# Patient Record
Sex: MALE | Race: WHITE | NOT HISPANIC OR LATINO
[De-identification: names, ages, dates, MRNs, and addresses within clinical notes are randomized per-mention and may not be internally consistent; named-entity substitution may affect disease eponyms.]

---

## 2024-01-17 ENCOUNTER — APPOINTMENT (OUTPATIENT)
Dept: PULMONOLOGY | Facility: CLINIC | Age: 69
End: 2024-01-17

## 2024-02-20 ENCOUNTER — APPOINTMENT (OUTPATIENT)
Dept: PULMONOLOGY | Facility: CLINIC | Age: 69
End: 2024-02-20
Payer: MEDICARE

## 2024-02-20 VITALS
RESPIRATION RATE: 16 BRPM | OXYGEN SATURATION: 97 % | SYSTOLIC BLOOD PRESSURE: 118 MMHG | DIASTOLIC BLOOD PRESSURE: 82 MMHG | HEIGHT: 62 IN | BODY MASS INDEX: 37.54 KG/M2 | HEART RATE: 112 BPM | WEIGHT: 204 LBS

## 2024-02-20 DIAGNOSIS — J45.909 UNSPECIFIED ASTHMA, UNCOMPLICATED: ICD-10-CM

## 2024-02-20 DIAGNOSIS — R06.83 SNORING: ICD-10-CM

## 2024-02-20 DIAGNOSIS — Z82.49 FAMILY HISTORY OF ISCHEMIC HEART DISEASE AND OTHER DISEASES OF THE CIRCULATORY SYSTEM: ICD-10-CM

## 2024-02-20 DIAGNOSIS — Z82.69 FAMILY HISTORY OF OTHER DISEASES OF THE MUSCULOSKELETAL SYSTEM AND CONNECTIVE TISSUE: ICD-10-CM

## 2024-02-20 PROCEDURE — 99205 OFFICE O/P NEW HI 60 MIN: CPT

## 2024-02-20 PROCEDURE — G2211 COMPLEX E/M VISIT ADD ON: CPT

## 2024-02-20 NOTE — REVIEW OF SYSTEMS
[Obesity] : obesity [Fever] : no fever [Fatigue] : no fatigue [Recent Wt Gain (___ Lbs)] : ~T no recent weight gain [Chills] : no chills [Recent Wt Loss (___ Lbs)] : ~T no recent weight loss [Epistaxis] : no epistaxis [Sore Throat] : no sore throat [Nasal Congestion] : no nasal congestion [Postnasal Drip] : no postnasal drip [Dry Mouth] : no dry mouth [Cough] : no cough [Hemoptysis] : no hemoptysis [Sputum] : no sputum [Dyspnea] : no dyspnea [Wheezing] : no wheezing [SOB on Exertion] : no sob on exertion [Chest Discomfort] : no chest discomfort [Palpitations] : no palpitations [GERD] : no gerd [Abdominal Pain] : no abdominal pain [Nausea] : no nausea [Vomiting] : no vomiting [Dysphagia] : no dysphagia [Bleeding] : no bleeding [Back Pain] : no back pain [Chronic Pain] : no chronic pain [Rash] : no rash [Blood Transfusion] : no blood transfusion [Clotting Disorder/ Frequent bleeding] : no clotting disorder/ frequent bleeding [Diabetes] : no diabetes [Thyroid Problem] : no thyroid problem

## 2024-02-20 NOTE — ASSESSMENT
[FreeTextEntry1] : 67y/o  male       1- asthmatic  bronchitis  history   + ex smoker  < 20 pack years       + fumes  2- snoring  napping  daytime sleepiness  BMI   37     ? sleep disorder  3- vaccinations per primary   Recommendations 1- cxr   report requested 2- PFT 3- home sleep study and sleep hygiene reviewed  4- weight loss exercise    sleep on his side   above  reviewed  agreement  on plan and reviewed sleep issues   in detail  including  complications of untreated sleep apnea

## 2024-02-20 NOTE — HISTORY OF PRESENT ILLNESS
[Snoring] : snoring [Former] : former [< 20 pack-years] : < 20 pack-years [Never] : never [TextBox_4] : 2/202/2024   spouse     Milagrool   here for  PFT 67y/o  male born in [place]   ex smoker (   15 y/o - up to  3/4 ppd  x   quit   37 y/o  >   15 pack years ) retired   autobody     -     wheezing -  had asthmatic bronchitis    with wheezing    treated  -    for cough persistent now improved  prednisone  helped   now   finished  - here for PFT /w  and    sleep  noted  snoring    -per wife does not sleep  well due to  her husbands loud snoring and   noises at night -   [TextBox_11] : 3/4 [TextBox_13] : 20 [YearQuit] : 1980's [Awakes with Dry Mouth] : does not awaken with dry mouth [Awakes with Headache] : does not awaken with headache [Fatigue] : no fatigue [TextBox_77] : 9-10  [TextBox_79] : 7  [TextBox_83] : 1

## 2024-04-04 ENCOUNTER — APPOINTMENT (OUTPATIENT)
Dept: PULMONOLOGY | Facility: CLINIC | Age: 69
End: 2024-04-04
Payer: MEDICARE

## 2024-04-04 VITALS
DIASTOLIC BLOOD PRESSURE: 78 MMHG | BODY MASS INDEX: 37.54 KG/M2 | WEIGHT: 204 LBS | OXYGEN SATURATION: 98 % | HEART RATE: 93 BPM | RESPIRATION RATE: 16 BRPM | SYSTOLIC BLOOD PRESSURE: 126 MMHG | HEIGHT: 62 IN

## 2024-04-04 PROCEDURE — G2211 COMPLEX E/M VISIT ADD ON: CPT

## 2024-04-04 PROCEDURE — 99214 OFFICE O/P EST MOD 30 MIN: CPT

## 2024-04-04 NOTE — ASSESSMENT
[FreeTextEntry1] : 70y/o  male       1- asthmatic  bronchitis  history   + ex smoker  < 20 pack years       + fumes  2-  BMI   37     3/24 severe PRESTON  3- vaccinations per primary   Recommendations 1- agrees to  autopap   ordered sleep hygiene reviewed    2- PFT 3-albuterol MDI  two inh  q  6 hour prn  4- cxr report requested again  if cannot  find repeat 5- ENT  upper airway    inspection   reviewed sleep   issues  risks of untreated sleep apnea and benefits of treatment prevention weight loss and  agreement o plan

## 2024-04-04 NOTE — HISTORY OF PRESENT ILLNESS
[Former] : former [< 20 pack-years] : < 20 pack-years [Never] : never [Snoring] : snoring [Home] : home [TextBox_4] : 2/202/2024   spouse     Milagrool   here for  PFT 67y/o  male [place] in [place]   ex smoker (   15 y/o - up to  3/4 ppd  x   quit   39 y/o  >   15 pack years ) retired   autobody     -     wheezing -  had asthmatic bronchitis    with wheezing    treated  -    for cough persistent now improved  prednisone  helped   now   finished  - here for PFT /w  and    sleep  noted  snoring    -per wife does not sleep  well due to  her husbands loud snoring and   noises at night -  4/4/2024 68y/o   male  [place] in [place]  ex  smoker ( < 20 pack years  )   h/o  asthmatic bronchitis     f/u  + sleep study  KI - severe ki  reviewed  study  and  risk of untreated   ki options for treatment discussed cpap  with pictures  -reviewed  MDI use currently < 2 x week -sleep hygiene - no further  wheezing -  [TextBox_11] : 3/4 [TextBox_13] : 20 [YearQuit] : 1980's [Awakes with Dry Mouth] : does not awaken with dry mouth [Awakes with Headache] : does not awaken with headache [Fatigue] : no fatigue [TextBox_77] : 9-10  [TextBox_79] : 7  [TextBox_83] : 1 [TextBox_100] : 3/2024 [TextBox_108] : 58.1 [TextBox_116] : 64 [TextBox_120] : 49 minutes < 90 %

## 2024-04-04 NOTE — PHYSICAL EXAM
[No Acute Distress] : no acute distress [No Deformities] : no deformities [IV] : Mallampati Class: IV [Normal Appearance] : normal appearance [Supple] : supple [No Neck Mass] : no neck mass [No JVD] : no jvd [Normal Rate/Rhythm] : normal rate/rhythm [Normal S1, S2] : normal s1, s2 [No Murmurs] : no murmurs [No Resp Distress] : no resp distress [No Acc Muscle Use] : no acc muscle use [Clear to Auscultation Bilaterally] : clear to auscultation bilaterally [Benign] : benign [Not Tender] : not tender [No Masses] : no masses [Soft] : soft [No Hernias] : no hernias [Normal Bowel Sounds] : normal bowel sounds [Normal Gait] : normal gait [No Clubbing] : no clubbing [No Edema] : no edema [No Rash] : no rash [No Motor Deficits] : no motor deficits [Normal Affect] : normal affect [TextBox_2] : pleasant male no distress speaking full sentences no cough  [TextBox_11] : no lesion   moist

## 2024-05-16 ENCOUNTER — APPOINTMENT (OUTPATIENT)
Dept: OTOLARYNGOLOGY | Facility: CLINIC | Age: 69
End: 2024-05-16
Payer: MEDICARE

## 2024-05-16 VITALS
WEIGHT: 204 LBS | BODY MASS INDEX: 37.54 KG/M2 | SYSTOLIC BLOOD PRESSURE: 146 MMHG | HEIGHT: 62 IN | HEART RATE: 76 BPM | DIASTOLIC BLOOD PRESSURE: 78 MMHG

## 2024-05-16 DIAGNOSIS — G47.33 OBSTRUCTIVE SLEEP APNEA (ADULT) (PEDIATRIC): ICD-10-CM

## 2024-05-16 DIAGNOSIS — J35.1 HYPERTROPHY OF TONSILS: ICD-10-CM

## 2024-05-16 PROCEDURE — 99204 OFFICE O/P NEW MOD 45 MIN: CPT

## 2024-05-16 RX ORDER — ALLOPURINOL 200 MG/1
TABLET ORAL
Refills: 0 | Status: COMPLETED | COMMUNITY
End: 2024-05-16

## 2024-05-16 RX ORDER — ALBUTEROL SULFATE 90 UG/1
108 (90 BASE) INHALANT RESPIRATORY (INHALATION)
Qty: 3 | Refills: 3 | Status: COMPLETED | COMMUNITY
Start: 2024-04-04 | End: 2024-05-16

## 2024-05-16 RX ORDER — ATORVASTATIN CALCIUM 80 MG/1
TABLET, FILM COATED ORAL
Refills: 0 | Status: ACTIVE | COMMUNITY

## 2024-05-16 NOTE — PHYSICAL EXAM
[FreeTextEntry1] : Overweight [de-identified] : Size = 18" [Midline] : trachea located in midline position [de-identified] : 2+ [de-identified] : Mallampati IV [Normal] : no rashes

## 2024-05-16 NOTE — HISTORY OF PRESENT ILLNESS
[de-identified] : 69 year old male here for PRESTON, referred by Dr. Sapp for enlarged tonsils. Pt's wife reports snoring at night, with apneic periods and she states he "blows bubbles" when laying on his back. Pt states frequent waking at night. Denies daytime fatigue but does state he falls asleep while watching tv.

## 2024-05-16 NOTE — CONSULT LETTER
[Dear  ___] : Dear  [unfilled], [Please see my note below.] : Please see my note below. [Sincerely,] : Sincerely, [DrMichelle  ___] : Dr. HANCOCK [Consult Letter:] : I had the pleasure of evaluating your patient, [unfilled]. [Consult Closing:] : Thank you very much for allowing me to participate in the care of this patient.  If you have any questions, please do not hesitate to contact me. [FreeTextEntry2] : Santana Lama MD [FreeTextEntry3] : Alex Schrader MD, FACS Chief of Otolaryngology at [facility]  Dept. of Otolaryngology [facility]

## 2024-05-16 NOTE — ASSESSMENT
[FreeTextEntry1] : Pt with severe PRESTON.  A UPPP and tonsillectomy might help, but in all likelihood, he will still need CPAP.  Pt was advised to start CPAP.  If he is unable to wear the CPAP he will return to be considered for surgical intervention.  Based on BMI, pt not currently a candidate for the INSPIRE device.  Pt would need to get down to 180 lbs to be a candidate.  Pt to work on weight loss.  Strategies for weight loss and exercise discussed in detail.

## 2024-06-11 ENCOUNTER — APPOINTMENT (OUTPATIENT)
Dept: PULMONOLOGY | Facility: CLINIC | Age: 69
End: 2024-06-11
Payer: MEDICARE

## 2024-06-11 VITALS — HEIGHT: 61 IN | WEIGHT: 205 LBS | BODY MASS INDEX: 38.71 KG/M2

## 2024-06-11 VITALS
RESPIRATION RATE: 16 BRPM | SYSTOLIC BLOOD PRESSURE: 134 MMHG | OXYGEN SATURATION: 96 % | HEART RATE: 72 BPM | DIASTOLIC BLOOD PRESSURE: 72 MMHG

## 2024-06-11 DIAGNOSIS — J45.31 MILD PERSISTENT ASTHMA WITH (ACUTE) EXACERBATION: ICD-10-CM

## 2024-06-11 DIAGNOSIS — Z71.89 OTHER SPECIFIED COUNSELING: ICD-10-CM

## 2024-06-11 PROCEDURE — 85018 HEMOGLOBIN: CPT | Mod: QW

## 2024-06-11 PROCEDURE — 99214 OFFICE O/P EST MOD 30 MIN: CPT | Mod: 25

## 2024-06-11 PROCEDURE — 94727 GAS DIL/WSHOT DETER LNG VOL: CPT

## 2024-06-11 PROCEDURE — 94729 DIFFUSING CAPACITY: CPT

## 2024-06-11 PROCEDURE — 94010 BREATHING CAPACITY TEST: CPT

## 2024-06-11 NOTE — ASSESSMENT
[FreeTextEntry1] : 70y/o  male       1- asthmatic  bronchitis  history   + ex smoker  < 20 pack years       + fumes  2-  BMI   37     3/24 severe PRESTON / sleepiness  3- ? feet swelling  with ? neuropathy  4- vaccinations per primary   Recommendations 1-  autopap  tolerated  2- weight loss and exercise  3-albuterol MDI  two inh  q  6 hour prn  4- cxr report requested again  if cannot  find repeat 5- reviewed elevate head of bed  compression socks   has  f/u  primary  6- biotene   oral rinse reviewed    Mr Aguilar is using his  cpap machine and  is benefiting from this   feeling more  refreshed less sleepiness

## 2024-06-11 NOTE — PROCEDURE
[FreeTextEntry1] : PFT normal   FVC  FEV1 and R     mild reduced   MEF 25 %   normal  TLC  normal DLCO

## 2024-06-11 NOTE — HISTORY OF PRESENT ILLNESS
[Former] : former [< 20 pack-years] : < 20 pack-years [Never] : never [Snoring] : snoring [Home] : home [TextBox_4] : 2/202/2024   spouse     Milagrool   here for  PFT 67y/o  male born in [place]   ex smoker (   15 y/o - up to  3/4 ppd  x   quit   39 y/o  >   15 pack years ) retired   autobody     -     wheezing -  had asthmatic bronchitis    with wheezing    treated  -    for cough persistent now improved  prednisone  helped   now   finished  - here for PFT /w  and    sleep  noted  snoring    -per wife does not sleep  well due to  her husbands loud snoring and   noises at night -  4/4/2024 70y/o   male  born in [place]  ex  smoker ( < 20 pack years  )   h/o  asthmatic bronchitis     f/u  + sleep study  KI - severe ki  reviewed  study  and  risk of untreated   ki options for treatment discussed cpap  with pictures  -reviewed  MDI use currently < 2 x week -sleep hygiene - no further  wheezing -   6/11/2024 70y/o  male  ex smoker  asthmatic  bronchitis   not active  KI - received machine  feeling more refreshed and alert   - no chest pain no cough  -PFT reviewed below [TextBox_11] : 3/4 [TextBox_13] : 20 [YearQuit] : 1980's [Awakes with Dry Mouth] : does not awaken with dry mouth [Awakes with Headache] : does not awaken with headache [Fatigue] : no fatigue [APAP:] : APAP [Nasal mask] : nasal mask [TextBox_77] : 9-10  [TextBox_79] : 7  [TextBox_83] : 1 [TextBox_100] : 3/2024 [TextBox_108] : 58.1 [TextBox_116] : 64 [TextBox_120] : 49 minutes < 90 % [TextBox_125] : 4-20 [TextBox_127] : 5/28/24 [TextBox_129] : 6/10/2024 [TextBox_133] : 14/14   100 % [TextBox_137] : 100 % [TextBox_141] : 8 [TextBox_143] : 34 [TextBox_147] : 1.9 [TextBox_158] : [employer] [TextBox_162] : 4/2024 [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

## 2024-06-11 NOTE — PHYSICAL EXAM
[Enlarged Base of the Tongue] : enlarged base of the tongue [IV] : Mallampati Class: IV [Normal Appearance] : normal appearance [Supple] : supple [No JVD] : no jvd [Normal Rate/Rhythm] : normal rate/rhythm [Normal S1, S2] : normal s1, s2 [No Murmurs] : no murmurs [No Resp Distress] : no resp distress [Clear to Auscultation Bilaterally] : clear to auscultation bilaterally [Benign] : benign [Not Tender] : not tender [No Masses] : no masses [Soft] : soft [Normal Bowel Sounds] : normal bowel sounds [Normal Gait] : normal gait [No Clubbing] : no clubbing [No Rash] : no rash [No Motor Deficits] : no motor deficits [Normal Affect] : normal affect [TextBox_2] : pleasant male no distress speaking full sentences no cough [TextBox_11] : no lesion moist [TextBox_105] : mild  feet changes  due to socks

## 2024-07-18 ENCOUNTER — APPOINTMENT (OUTPATIENT)
Dept: ORTHOPEDIC SURGERY | Facility: CLINIC | Age: 69
End: 2024-07-18
Payer: MEDICARE

## 2024-07-18 VITALS
HEIGHT: 61 IN | DIASTOLIC BLOOD PRESSURE: 82 MMHG | HEART RATE: 70 BPM | BODY MASS INDEX: 39.08 KG/M2 | SYSTOLIC BLOOD PRESSURE: 130 MMHG | WEIGHT: 207 LBS

## 2024-07-18 DIAGNOSIS — M47.816 SPONDYLOSIS W/OUT MYELOPATHY OR RADICULOPATHY, LUMBAR REGION: ICD-10-CM

## 2024-07-18 DIAGNOSIS — G60.9 HEREDITARY AND IDIOPATHIC NEUROPATHY, UNSPECIFIED: ICD-10-CM

## 2024-07-18 PROCEDURE — 99204 OFFICE O/P NEW MOD 45 MIN: CPT

## 2024-07-18 RX ORDER — GABAPENTIN 300 MG/1
300 CAPSULE ORAL TWICE DAILY
Qty: 60 | Refills: 1 | Status: ACTIVE | COMMUNITY
Start: 2024-07-18 | End: 1900-01-01

## 2024-08-05 ENCOUNTER — NON-APPOINTMENT (OUTPATIENT)
Age: 69
End: 2024-08-05

## 2024-08-07 ENCOUNTER — APPOINTMENT (OUTPATIENT)
Dept: PHYSICAL MEDICINE AND REHAB | Facility: CLINIC | Age: 69
End: 2024-08-07

## 2024-08-07 PROCEDURE — 99204 OFFICE O/P NEW MOD 45 MIN: CPT

## 2024-08-07 NOTE — DATA REVIEWED
[MRI] : MRI [FreeTextEntry1] : EXAM: MRI-LUMBAR SPINE NON CONTRAST  HISTORY: Bilateral ankle swelling   TECHNIQUE: Axial and sagittal multi-weighted images of the lumbar spine were obtained on a 3.0 Cyndi magnet.  COMPARISON: MRI 2016.  FINDINGS:  There is no fracture, bone marrow edema or suspicious marrow replacing lesion.  Spinal alignment is normal.  Multilevel disc desiccation Degenerative appearing endplate fatty change with Schmorl's nodes at T10-L1 and L2-S1 levels (Modic type II change). Mild posterior paraspinal muscle atrophy. Left nephrectomy.  The conus medullaris terminates at L1.  L1-L2: Right lateral recess annular fissure with disc protrusion. Mild bilateral facet arthrosis. Minimal spinal canal stenosis. No foraminal stenosis. L2-L3: Mild disc bulge and facet arthrosis. No significant canal or foraminal stenosis. L3-L4: Disc bulge. Right foraminal zone annular fissure and disc protrusion. Moderate right greater than left facet arthrosis. Mild lateral recess narrowing. Moderate right and mild left foraminal narrowing. There is impingement of exiting right L3 nerve root. L4-L5: Disc bulge eccentric to the left with left lateral recess annular fissure and disc protrusion. Moderate bilateral facet arthrosis. Mild right and moderate left lateral recess narrowing. Mild left greater than right foraminal narrowing. There is impingement of exiting left L4 and descending left L5 nerve roots. L5-S1: Disc bulge eccentric to the left with bilateral foraminal zone annular fissures and disc protrusions. Mild bilateral facet arthrosis. Mild lateral recess narrowing and moderate foraminal narrowing. There is impingement of exiting L5 nerve roots.  IMPRESSION:   Stable multilevel degenerative change with areas of nerve root impingement at the L3-S1 levels  Signed by: Abraham Nazario Signed Date: 7/9/2024 7:26 AM EDT    SIGNED BY: Abraham Nazario M.D., Ext. [phone] 07/09/2024 07:26 AM

## 2024-08-07 NOTE — ASSESSMENT
[FreeTextEntry1] : Mr. ANKIT NAVARRO is a 69 year male who presents with bilateral plantar foot numbness, tingling and diminished sensation x2 years without pain.  Remote history of Lumbar radiculopathy previously treated with lumbar epidural steroid injection with improvement.  I had an extensive discussion with the patient and his wife today reviewing the patient's MRI L-spine, his symptomatology, and the workup he has had thus far with cardiology and his primary care physician, which have not resulted in a cardiac or hematologic explanation for his symptoms.  We reviewed the possible causes of peripheral neuropathy including but not limited to lumbar radiculopathy, peripheral nerve entrapment, bilateral leg edema, heart failure, vitamin B12 and folate deficiencies, infectious etiologies, diabetes, autoimmune/connective tissue conditions, environmental exposures.  The patient received referral to neurology and vascular surgery for further evaluation of his peripheral neuropathy.  I discussed with the patient that I agree with my orthopedic colleague, Dr. Mora, that his symptoms do not appear to have a spinal etiology.  At this time I am recommending that the patient obtain a EMG/NCS, agree with referral to neurology and vascular surgery and additionally will provide a referral to podiatry.  The patient did not report improvement of his symptoms with gabapentin, also did not report any adverse effects.  Reviewed that he was started on a low-dose of gabapentin and at this time would recommend titrating up to 600 mg twice daily.     Impression: peripheral neuropathy  Numbness and tingling of bilateral feet   Plan:  - Ortho notes reviewed  -MR L-spine reviewed -Increase gabapentin from 300 mg twice daily to 300 mg in the morning and 600 mg at bedtime for 3 days then if tolerated increase to 600 mg twice daily Rx sent -Agree with referral to vascular surgery and neurology -Referral provided for EMG/NCS -Referral provided for podiatry -Reviewed with the patient and his wife that if numbness, tingling and weakness begin to travel up his legs, then to seek urgent neurological evaluation. -Return to clinic in 1 month for medication management    The patient expressed verbal understanding and is in agreement with the plan of care. All of the patient's questions and concerns were addressed during today's visit.

## 2024-08-07 NOTE — REVIEW OF SYSTEMS
[Fever] : no fever [Chills] : no chills [Chest Pain] : no chest pain [Shortness Of Breath] : no shortness of breath [Incontinence] : no incontinence [Joint Pain] : no joint pain [Joint Stiffness] : no joint stiffness [Muscle Pain] : no muscle pain [Dizziness] : no dizziness [Difficulty Walking] : no difficulty walking [de-identified] : Numbness and tingling of bilateral feet

## 2024-08-07 NOTE — PHYSICAL EXAM
[FreeTextEntry1] : Constitutional: In NAD, calm and cooperative Heart: well perfused Lungs: nonlabored breathing MSK (Back) Inspection: no gross swelling identified, no scars Palpation: Non-Tender bilateral lower lumbar paraspinals.  Minimal pitting edema at bilateral ankles.  Nontender to palpation over calcaneal tuberosity or along metatarsal bones.  Nontender to palpation over bilateral medial and lateral malleolus ROM: Full and pain-free ankle dorsiflexion, plantarflexion, inversion, eversion Strength: 5/5 strength in bilateral lower extremities Reflexes: 2+ Patella reflex bilaterally, 2+ Achilles reflex bilaterally, negative clonus bilaterally Sensation: Diminished to light touch over left medial malleolus and left metatarsal heads on the plantar surface of the foot.  First toe proprioception intact bilaterally Special tests: SLR: Negative bilaterally MERYL: Negative bilaterally FADIR: Negative bilaterally Angie's Finger: Negative bilaterally Facet loading: Negative bilaterally

## 2024-08-07 NOTE — HISTORY OF PRESENT ILLNESS
[FreeTextEntry1] : Mr. ANKIT NAVARRO is a 69 year male who presents with bilateral foot neuropathy      HPI  08/07/2024 Location: plantar feet  Onset: jan 2022, with numbness and tingling over the plantar aspect of BL feet without pain. Seen by Ortho, Dr. Mora, started on gabapentin 300mg BID, denies improvement in numbness or tingling, also denies any adverse effects of this medication.  He does note a history of what sounds like low back pain with valve or lumbar radiculopathy which was treated with lumbar epidural injections ~6 years ago, currently denies any low back pain or radicular symptoms.  The patient worked as a  and autobody .  Denies history of diabetes or thyroid issues, or vitamin deficiencies states that he and was told his lab work is all within normal limits regularly follows with his primary care physician.  He has also seen the cardiologist regarding his ankle swelling, states he had an echocardiogram done 3 weeks ago and was told there were no abnormalities. Provocation/Palliative: no particular activity worsens symptoms.  Quality: numbness, tingling,  Radiation: nonradiating     Denies any associated leg weakness. Denies any loss of bowel/bladder control or any groin numbness.   Current pain medications: gabapentin 300mg BID    Previous medications trialed: oxycodone - for back pain many years ago methadone - for back pain many years ago   Previous procedures relevant to complaint: Injections:    hx of LESI x3 ~2018? Surgery: none for spine or joints       Conservative therapy tried: Physical Therapy: none     Diagnostic studies reviewed by me: MRI L spine -mild multilevel degenerative changes and facet arthropathy.  Disc bulge noted at L3-L4 with mild to moderate bilateral neuroforaminal stenosis, no significant central stenosis.  Broad-based disc bulge at L4-5 with mild neuroforaminal stenosis and central stenosis.

## 2024-08-08 ENCOUNTER — APPOINTMENT (OUTPATIENT)
Dept: PHYSICAL MEDICINE AND REHAB | Facility: CLINIC | Age: 69
End: 2024-08-08

## 2024-08-08 PROBLEM — G57.12 MERALGIA PARESTHETICA OF LEFT SIDE: Status: ACTIVE | Noted: 2024-08-08

## 2024-08-08 PROCEDURE — 95885 MUSC TST DONE W/NERV TST LIM: CPT | Mod: LT

## 2024-08-08 PROCEDURE — 95909 NRV CNDJ TST 5-6 STUDIES: CPT

## 2024-08-08 NOTE — ASSESSMENT
[FreeTextEntry1] : EMG/NCS B/L LE performed at today's office visit.  Normal electrodiagnostic evaluation of both legs.  There is no electrodiagnostic evidence of a large fiber, length dependent, sensorimotor, peripheral polyneuropathy affecting the patient's legs.  Full report to follow.

## 2024-08-08 NOTE — PROCEDURE
[de-identified] : PRE-PROCEDURE  * Was H&P completed and in chart at time of procedure ?  YES * Were the indications for the procedure appropriate ?  YES * Were the practitioner's entries in the patient's medical record appropriate ?  YES  PROCEDURE * Did the practitioner maintain proper sterile technique ?  YES * If bleeding was encountered, did the practitioner manage it appropriately?  YES * Were complications, if any, recognized and managed appropriately?  YES * Was the practitioner's use of diagnostic services (e.g., lab, x-ray, MRI, CT) appropriate?  YES  POST-PROCEDURE * Did the pre-procedure diagnosis coincide with the findings of the procedure?  YES * Was the procedure report complete, accurate and timely?  YES

## 2024-08-22 ENCOUNTER — APPOINTMENT (OUTPATIENT)
Dept: ORTHOPEDIC SURGERY | Facility: CLINIC | Age: 69
End: 2024-08-22
Payer: MEDICARE

## 2024-08-22 VITALS
SYSTOLIC BLOOD PRESSURE: 111 MMHG | HEIGHT: 62 IN | HEART RATE: 71 BPM | BODY MASS INDEX: 37.17 KG/M2 | WEIGHT: 202 LBS | DIASTOLIC BLOOD PRESSURE: 72 MMHG

## 2024-08-22 DIAGNOSIS — R20.0 ANESTHESIA OF SKIN: ICD-10-CM

## 2024-08-22 DIAGNOSIS — R20.2 ANESTHESIA OF SKIN: ICD-10-CM

## 2024-08-22 DIAGNOSIS — G60.9 HEREDITARY AND IDIOPATHIC NEUROPATHY, UNSPECIFIED: ICD-10-CM

## 2024-08-22 PROCEDURE — 99213 OFFICE O/P EST LOW 20 MIN: CPT

## 2024-08-22 RX ORDER — GABAPENTIN 600 MG/1
600 TABLET, COATED ORAL 3 TIMES DAILY
Qty: 90 | Refills: 1 | Status: ACTIVE | COMMUNITY
Start: 2024-08-22 | End: 1900-01-01

## 2024-08-22 NOTE — HISTORY OF PRESENT ILLNESS
[de-identified] : Chief Complaint: Bilateral foot numbness and tingling   History of Present Illness: 69-year-old male presenting today for review of his lower extremity neuropathy.  Francesco has been dealing with a neuropathic feeling feelings of that his socks are bunched up on the plantar aspect of his feet minimal discomfort he has been diagnosed with peripheral neuropathy he had a bilateral lower extremity EMG which was negative.  Workup for spine was also negative and therefore we believe that his diagnosis is consistent with a peripheral neuropathy.  He has been taking gabapentin 1200 mg/day notices no significant side effects from the medication but is also not having any relief.   Past medical history, past surgical history, medications, allergies, social history, and family history are as documented in our records today.  Notable items include: None   Review of Systems: I have reviewed the patient's documented Review of Systems data today, I concur with this documentation.

## 2024-08-22 NOTE — DISCUSSION/SUMMARY
[de-identified] : 69-year-old male with peripheral neuropathy bilateral feet  Francesco is currently taking 1200 mg of gabapentin per day and his symptoms are not any better. We will increase his gabapentin to 600 mg 3 times a day I will like see him back in 4 weeks to see how he is doing if his symptoms have still not improved we can consider increasing his dose to 900 mg 3 times a day

## 2024-09-06 ENCOUNTER — APPOINTMENT (OUTPATIENT)
Dept: PHYSICAL MEDICINE AND REHAB | Facility: CLINIC | Age: 69
End: 2024-09-06
Payer: MEDICARE

## 2024-09-06 VITALS
HEART RATE: 77 BPM | WEIGHT: 197 LBS | SYSTOLIC BLOOD PRESSURE: 111 MMHG | DIASTOLIC BLOOD PRESSURE: 70 MMHG | HEIGHT: 62 IN | BODY MASS INDEX: 36.25 KG/M2

## 2024-09-06 DIAGNOSIS — R20.0 ANESTHESIA OF SKIN: ICD-10-CM

## 2024-09-06 DIAGNOSIS — G60.9 HEREDITARY AND IDIOPATHIC NEUROPATHY, UNSPECIFIED: ICD-10-CM

## 2024-09-06 DIAGNOSIS — R20.2 ANESTHESIA OF SKIN: ICD-10-CM

## 2024-09-06 PROCEDURE — 99214 OFFICE O/P EST MOD 30 MIN: CPT

## 2024-09-06 RX ORDER — LIDOCAINE 5 G/100G
5 OINTMENT TOPICAL
Qty: 2 | Refills: 1 | Status: ACTIVE | COMMUNITY
Start: 2024-09-06 | End: 1900-01-01

## 2024-09-06 NOTE — HISTORY OF PRESENT ILLNESS
Bernard ILNOLAN enrollment:  Please indicate which Alert group you would like the patient enrolled in:   1.  Syncope/palpitations - all alerts on.  AF on for 23 hours   2.  Cryptogenic stroke  - AF alert on only   3.  AF management - no alerts on.  Summary transmissions only    Please let us know within 24 hours of this message   [FreeTextEntry1] : Mr. ANKIT NAVARRO is a 69 year male who presents with bilateral foot neuropathy    Interval 09/06/2024  Mr. ANKIT NAVARRO is a 69 year male presents for follow up for BL foot neuropathy. At last visit, gabapentin was increased to 600mg BID, referrals to podiatry and PM&R for EMG were provided. NCS/EMG shows no electrodiagnostic evidence for a large fiber peripheral neuropathy. He had a follow up visit with [facility] (Dr. Mora) on 8/22/24, where gabapentin dose was increased to 600mg TID. He just started the increased dose 3 days ago. He denies any adverse effects to the medication. Denies any improvement of his symptoms. He saw a podiatrist, without any new diagnostic testing. Also believes he saw a vascular surgeon and neurologist (outside of system), was told everything was ok from their standpoint. Did not bring in records today, but states he requested they be faxed to Dr. Mora's office.  BL feet continued to feel numb, as if there is a balled up sock under the ball of his feet and toes. He does note some increased sensitivity to the toes when he tries to put pressure on them. Denies any proximal spread of symptoms.   Denies any associated leg weakness. Denies any loss of bowel/bladder control or any groin numbness.    HPI  08/07/2024 Location: plantar feet  Onset: jan 2022, with numbness and tingling over the plantar aspect of BL feet without pain. Seen by Ortho, Dr. Mora, started on gabapentin 300mg BID, denies improvement in numbness or tingling, also denies any adverse effects of this medication.  He does note a history of what sounds like low back pain with valve or lumbar radiculopathy which was treated with lumbar epidural injections ~6 years ago, currently denies any low back pain or radicular symptoms.  The patient worked as a  and autobody .  Denies history of diabetes or thyroid issues, or vitamin deficiencies states that he and was told his lab work is all within normal limits regularly follows with his primary care physician.  He has also seen the cardiologist regarding his ankle swelling, states he had an echocardiogram done 3 weeks ago and was told there were no abnormalities. Provocation/Palliative: no particular activity worsens symptoms.  Quality: numbness, tingling,  Radiation: nonradiating     Denies any associated leg weakness. Denies any loss of bowel/bladder control or any groin numbness.   Current pain medications: gabapentin 600mg TID    Previous medications trialed: oxycodone - for back pain many years ago methadone - for back pain many years ago   Previous procedures relevant to complaint: Injections:    hx of LESI x3 ~2018? Surgery: none for spine or joints       Conservative therapy tried: Physical Therapy: none     Diagnostic studies reviewed by me: MRI L spine -mild multilevel degenerative changes and facet arthropathy.  Disc bulge noted at L3-L4 with mild to moderate bilateral neuroforaminal stenosis, no significant central stenosis.  Broad-based disc bulge at L4-5 with mild neuroforaminal stenosis and central stenosis.  EMG: EMG/NCS B/L LE performed at today's office visit. Normal electrodiagnostic evaluation of both legs. There is no electrodiagnostic evidence of a large fiber, length dependent, sensorimotor, peripheral polyneuropathy affecting the patient's legs.

## 2024-09-06 NOTE — REVIEW OF SYSTEMS
[Fever] : no fever [Chills] : no chills [Chest Pain] : no chest pain [Shortness Of Breath] : no shortness of breath [Incontinence] : no incontinence [Joint Pain] : no joint pain [Joint Stiffness] : no joint stiffness [Muscle Pain] : no muscle pain [Dizziness] : no dizziness [Difficulty Walking] : no difficulty walking [de-identified] : Numbness and tingling of bilateral feet

## 2024-09-06 NOTE — DATA REVIEWED
[EMG] : EMG [MRI] : MRI [FreeTextEntry1] : EMG/NCS B/L LE performed at today's office visit. Normal electrodiagnostic evaluation of both legs. There is no electrodiagnostic evidence of a large fiber, length dependent, sensorimotor, peripheral polyneuropathy affecting the patient's legs.    EXAM: MRI-LUMBAR SPINE NON CONTRAST  HISTORY: Bilateral ankle swelling   TECHNIQUE: Axial and sagittal multi-weighted images of the lumbar spine were obtained on a 3.0 Cyndi magnet.  COMPARISON: MRI 2016.  FINDINGS:  There is no fracture, bone marrow edema or suspicious marrow replacing lesion.  Spinal alignment is normal.  Multilevel disc desiccation Degenerative appearing endplate fatty change with Schmorl's nodes at T10-L1 and L2-S1 levels (Modic type II change). Mild posterior paraspinal muscle atrophy. Left nephrectomy.  The conus medullaris terminates at L1.  L1-L2: Right lateral recess annular fissure with disc protrusion. Mild bilateral facet arthrosis. Minimal spinal canal stenosis. No foraminal stenosis. L2-L3: Mild disc bulge and facet arthrosis. No significant canal or foraminal stenosis. L3-L4: Disc bulge. Right foraminal zone annular fissure and disc protrusion. Moderate right greater than left facet arthrosis. Mild lateral recess narrowing. Moderate right and mild left foraminal narrowing. There is impingement of exiting right L3 nerve root. L4-L5: Disc bulge eccentric to the left with left lateral recess annular fissure and disc protrusion. Moderate bilateral facet arthrosis. Mild right and moderate left lateral recess narrowing. Mild left greater than right foraminal narrowing. There is impingement of exiting left L4 and descending left L5 nerve roots. L5-S1: Disc bulge eccentric to the left with bilateral foraminal zone annular fissures and disc protrusions. Mild bilateral facet arthrosis. Mild lateral recess narrowing and moderate foraminal narrowing. There is impingement of exiting L5 nerve roots.  IMPRESSION:   Stable multilevel degenerative change with areas of nerve root impingement at the L3-S1 levels  Signed by: Abraham Nazario Signed Date: 7/9/2024 7:26 AM EDT    SIGNED BY: Abraham Nazario M.D., Ext. [phone] 07/09/2024 07:26 AM

## 2024-09-06 NOTE — PHYSICAL EXAM
[FreeTextEntry1] : Constitutional: In NAD, calm and cooperative Heart: well perfused Lungs: nonlabored breathing MSK (Back) Inspection: no gross swelling identified, no scars Palpation: Non-Tender bilateral lower lumbar paraspinals.  Trace pitting edema at bilateral ankles.  Nontender to palpation over calcaneal tuberosity or along metatarsal bones.  Nontender to palpation over bilateral medial and lateral malleolus ROM: Full and pain-free ankle dorsiflexion, plantarflexion, inversion, eversion Strength: 5/5 strength in bilateral lower extremities Reflexes: 2+ Patella reflex bilaterally, 2+ Achilles reflex bilaterally, negative clonus bilaterally Sensation: Diminished to light touch over left medial malleolus and left metatarsal heads on the plantar surface of the foot.  First toe proprioception intact bilaterally Special tests: SLR: Negative bilaterally MERYL: Negative bilaterally FADIR: Negative bilaterally Angie's Finger: Negative bilaterally Facet loading: Negative bilaterally

## 2024-09-06 NOTE — ASSESSMENT
[FreeTextEntry1] : Mr. ANKIT NAVARRO is a 69 year male who presents with bilateral plantar foot numbness, tingling and diminished sensation x2 years without pain.  Remote history of Lumbar radiculopathy previously treated with lumbar epidural steroid injection with improvement.   I had an extensive discussion with the patient and his wife today reviewing the patient's EMG/NCS, his symptomatology, and the workup he has had thus far with podiatry, neurology and vascular surgery without explanation for his symptoms.  We reviewed the possible causes of peripheral neuropathy including but not limited to lumbar radiculopathy, peripheral nerve entrapment, bilateral leg edema, heart failure, vitamin B12 and folate deficiencies, infectious etiologies, diabetes, autoimmune/connective tissue conditions, environmental exposures.    I again discussed with the patient that I agree with my orthopedic colleague, Dr. Mora, that his symptoms do not appear to have a spinal etiology. His gabapentin dose was recently increased to 600mg TID, which he started 3 days ago. Discussed that effect may take another 1-2 weeks to fully occur. At this time, he should continue current gabapentin dose until follow up with Dr. Mora. Reviewed with patient that it is usually not advisable for multiple providers to be adjusting medications. Recommend discussing with Dr. Mora at next follow up whether he has any improvement of his symptoms. If not, likely will increase gabapentin to 900mg TID dosing prior to changing to a different neuropathic agent. 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 Patient will reach out to PCP for follow up after today's visit.     Impression: peripheral neuropathy  Numbness and tingling of bilateral feet   Plan:  - Ortho notes reviewed  - EMG reviewed  - patient states neurology notes have been faxed to Dr. Mora's office. Will also review once scanned into the chart.  - continue gabapentin 600mg TID, if no adverse effects, would recommend increasing dose to 900mg TID prior to switch to a different medication.  - f/u with PCP for lab work, including heavy metal panel  - Reviewed with the patient and his wife that if numbness, tingling and weakness begin to travel up his legs, then to seek urgent neurological evaluation. - stat lidocaine 5% ointment to BL feet TID PRN, rx sent.  - RTC PRN, patient will follow up with Dr. Mora as scheduled for medication management per last note.    I spent a total of  37  minutes on this encounter, including documentation, face to face time, care coordination and reviewing prior records  The patient expressed verbal understanding and is in agreement with the plan of care. All of the patient's questions and concerns were addressed during today's visit.

## 2024-09-10 ENCOUNTER — APPOINTMENT (OUTPATIENT)
Dept: PULMONOLOGY | Facility: CLINIC | Age: 69
End: 2024-09-10
Payer: MEDICARE

## 2024-09-10 VITALS
HEIGHT: 62 IN | WEIGHT: 194 LBS | RESPIRATION RATE: 16 BRPM | DIASTOLIC BLOOD PRESSURE: 86 MMHG | SYSTOLIC BLOOD PRESSURE: 126 MMHG | HEART RATE: 73 BPM | BODY MASS INDEX: 35.7 KG/M2 | OXYGEN SATURATION: 95 %

## 2024-09-10 DIAGNOSIS — Z71.89 OTHER SPECIFIED COUNSELING: ICD-10-CM

## 2024-09-10 PROCEDURE — 99213 OFFICE O/P EST LOW 20 MIN: CPT

## 2024-09-10 PROCEDURE — G2211 COMPLEX E/M VISIT ADD ON: CPT

## 2024-09-10 NOTE — ASSESSMENT
[FreeTextEntry1] : 70y/o  male       1- asthmatic  bronchitis  history   + ex smoker  < 20 pack years       + fumes  2-  BMI   37       3/24  severe PRESTON / sleepiness  3- ? feet swelling /  neuropathy  4- vaccinations per primary   Recommendations 1-  autopap  tolerated  and benefiting from  it,  using it  regularly  feel more refreshed less sleepiness  2- weight loss and exercise  3-albuterol MDI  two inh  q  6 hour prn  4- PFT 6/25     Mr Aguilar is using his  cpap machine and  is benefiting from this   feeling more  refreshed less sleepiness  f/u 6-12 months

## 2024-09-10 NOTE — PHYSICAL EXAM
[No Acute Distress] : no acute distress [No Deformities] : no deformities [IV] : Mallampati Class: IV [Normal Appearance] : normal appearance [Supple] : supple [No Neck Mass] : no neck mass [No JVD] : no jvd [Normal Rate/Rhythm] : normal rate/rhythm [Normal S1, S2] : normal s1, s2 [No Murmurs] : no murmurs [No Resp Distress] : no resp distress [No Acc Muscle Use] : no acc muscle use [Clear to Auscultation Bilaterally] : clear to auscultation bilaterally [Benign] : benign [Not Tender] : not tender [No HSM] : no hsm [No Hernias] : no hernias [Normal Gait] : normal gait [No Clubbing] : no clubbing [No Edema] : no edema [No Rash] : no rash [No Motor Deficits] : no motor deficits [Normal Affect] : normal affect [TextBox_2] : pleasant  male no distress no cough  [TextBox_11] : crowded no lesion moist

## 2024-09-10 NOTE — PROCEDURE
[FreeTextEntry1] : PFT 6/11/2024  normal   FVC  FEV1 and R     mild reduced   MEF 25 %   normal  TLC  normal DLCO  -Likely normal   cxr 12/2023  normal

## 2024-09-10 NOTE — HISTORY OF PRESENT ILLNESS
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 years ) retired   autobody     -     wheezing -  had asthmatic bronchitis    with wheezing    treated  -    for cough persistent now improved  prednisone  helped   now   finished  - here for PFT /w  and    sleep  noted  snoring    -per wife does not sleep  well due to  her husbands loud snoring and   noises at night -  4/4/2024 68y/o   male  born in [place]  ex  smoker ( < 20 pack years  )   h/o  asthmatic bronchitis     f/u  + sleep study  PRESTON - severe preston  reviewed  study  and  risk of untreated   preston options for treatment discussed cpap  with pictures  -reviewed  MDI use currently < 2 x week -sleep hygiene - no further  wheezing -   6/11/2024 68y/o  male  ex smoker  asthmatic  bronchitis   not active  PRESTON - received machine  feeling more refreshed and alert   - no chest pain no cough  -PFT reviewed below  9/10/2024 68y/o  male ex smoker    asthmatic bronchitis   PRESTON - doing well  -exercise  doing well     7 pound weight loss with  exercise now -compliance reviewed and  discussion   today  -   [TextBox_11] : 3/4 [TextBox_13] : 20 [YearQuit] : 1980's [Awakes with Dry Mouth] : does not awaken with dry mouth [Awakes with Headache] : does not awaken with headache [Fatigue] : no fatigue [TextBox_77] : 9-10  [TextBox_79] : 7  [TextBox_83] : 1 [TextBox_100] : 3/2024 [TextBox_108] : 58.1 [TextBox_116] : 64 [TextBox_120] : 49 minutes < 90 % [TextBox_125] : 4-20 [TextBox_127] : 8/2024 [TextBox_129] : 9/9/2024 [TextBox_133] : 30/30  100 % [TextBox_137] : 100 % [TextBox_141] : 8 [TextBox_143] : 54 [TextBox_147] : 2.2 [TextBox_158] : [employer] [TextBox_162] : 4/2024 [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

## 2024-09-19 ENCOUNTER — APPOINTMENT (OUTPATIENT)
Dept: ORTHOPEDIC SURGERY | Facility: CLINIC | Age: 69
End: 2024-09-19
Payer: MEDICARE

## 2024-09-19 VITALS
WEIGHT: 194 LBS | DIASTOLIC BLOOD PRESSURE: 72 MMHG | HEIGHT: 62 IN | BODY MASS INDEX: 35.7 KG/M2 | SYSTOLIC BLOOD PRESSURE: 118 MMHG | HEART RATE: 64 BPM

## 2024-09-19 DIAGNOSIS — G60.9 HEREDITARY AND IDIOPATHIC NEUROPATHY, UNSPECIFIED: ICD-10-CM

## 2024-09-19 PROCEDURE — 99213 OFFICE O/P EST LOW 20 MIN: CPT

## 2024-09-19 NOTE — DISCUSSION/SUMMARY
[de-identified] : 69-year-old male with bilateral foot neuropathy  I discussed with Francesco that he did not have a response to increase in the gabapentin he is currently taking 600 3 times a day and I believe that an increase in the medication would likely not provide a response is not having any severe symptom is more of a discomfort feeling that he is able to tolerate I recommend that we discontinue the gabapentin altogether see how he does if his symptoms worsen then we can always consider restarting it or increase in the dose He will reach out to me once the medication is completed and if his symptoms recur otherwise a follow-up on an as-needed basis

## 2024-09-19 NOTE — HISTORY OF PRESENT ILLNESS
[de-identified] : Chief Complaint: Bilateral lower extremity neuropathy   History of Present Illness: 69-year-old male history of bilateral feet neuropathy presenting today for his 4-week follow-up visit.  During her last visit we had increased his gabapentin to 600 mg 3 times a day Francesco states that his symptoms are similar he does not feel any different in his foot neuropathy he states that he still gets the mild aching pain at the end the day but the increased medication had no significant improvement.   Past medical history, past surgical history, medications, allergies, social history, and family history are as documented in our records today.  Notable items include: None   Review of Systems: I have reviewed the patient's documented Review of Systems data today, I concur with this documentation.

## 2025-09-04 ENCOUNTER — APPOINTMENT (OUTPATIENT)
Dept: PULMONOLOGY | Facility: CLINIC | Age: 70
End: 2025-09-04
Payer: MEDICARE

## 2025-09-04 VITALS
HEART RATE: 70 BPM | DIASTOLIC BLOOD PRESSURE: 74 MMHG | RESPIRATION RATE: 16 BRPM | OXYGEN SATURATION: 96 % | BODY MASS INDEX: 34.6 KG/M2 | WEIGHT: 188 LBS | HEIGHT: 62 IN | SYSTOLIC BLOOD PRESSURE: 118 MMHG

## 2025-09-04 DIAGNOSIS — Z71.89 OTHER SPECIFIED COUNSELING: ICD-10-CM

## 2025-09-04 DIAGNOSIS — J45.909 UNSPECIFIED ASTHMA, UNCOMPLICATED: ICD-10-CM

## 2025-09-04 PROCEDURE — G2211 COMPLEX E/M VISIT ADD ON: CPT

## 2025-09-04 PROCEDURE — 99214 OFFICE O/P EST MOD 30 MIN: CPT
